# Patient Record
Sex: FEMALE | URBAN - METROPOLITAN AREA
[De-identification: names, ages, dates, MRNs, and addresses within clinical notes are randomized per-mention and may not be internally consistent; named-entity substitution may affect disease eponyms.]

---

## 2017-01-10 ENCOUNTER — APPOINTMENT (RX ONLY)
Dept: URBAN - METROPOLITAN AREA CLINIC 73 | Facility: CLINIC | Age: 47
Setting detail: DERMATOLOGY
End: 2017-01-10

## 2017-01-10 DIAGNOSIS — L71.8 OTHER ROSACEA: ICD-10-CM

## 2017-01-10 PROCEDURE — ? TREATMENT REGIMEN

## 2017-01-10 PROCEDURE — 99213 OFFICE O/P EST LOW 20 MIN: CPT

## 2017-01-10 PROCEDURE — ? COUNSELING

## 2017-01-10 PROCEDURE — ? PRESCRIPTION

## 2017-01-10 RX ORDER — DOXYCYCLINE 40 MG/1
CAPSULE ORAL
Qty: 30 | Refills: 1 | Status: ERX | COMMUNITY
Start: 2017-01-10

## 2017-01-10 RX ORDER — CLINDAMYCIN PHOSPHATE 10 MG/ML
LOTION TOPICAL
Qty: 1 | Refills: 1 | Status: ERX | COMMUNITY
Start: 2017-01-10

## 2017-01-10 RX ADMIN — CLINDAMYCIN PHOSPHATE: 10 LOTION TOPICAL at 15:15

## 2017-01-10 RX ADMIN — DOXYCYCLINE: 40 CAPSULE ORAL at 15:15

## 2017-01-10 ASSESSMENT — LOCATION DETAILED DESCRIPTION DERM: LOCATION DETAILED: NASAL SUPRATIP

## 2017-01-10 ASSESSMENT — LOCATION SIMPLE DESCRIPTION DERM: LOCATION SIMPLE: NOSE

## 2017-01-10 ASSESSMENT — LOCATION ZONE DERM: LOCATION ZONE: NOSE

## 2017-01-10 NOTE — PROCEDURE: TREATMENT REGIMEN
Detail Level: Zone
Initiate Treatment: Oracea 40mg qd and clindamycin lotion qd, add BP wash every couple of days

## 2017-01-10 NOTE — HPI: SKIN LESION
Is This A New Presentation, Or A Follow-Up?: Follow Up Skin Lesion
How Severe Is Your Skin Lesion?: moderate
Has Your Skin Lesion Been Treated?: been treated
When Was It Treated?: October 2016

## 2017-02-09 ENCOUNTER — RX ONLY (OUTPATIENT)
Age: 47
Setting detail: RX ONLY
End: 2017-02-09

## 2017-02-09 RX ORDER — DOXYCYCLINE HYCLATE 120 MG/1
TABLET, DELAYED RELEASE ORAL
Qty: 30 | Refills: 1 | Status: ERX | COMMUNITY
Start: 2017-02-09

## 2017-03-07 ENCOUNTER — APPOINTMENT (RX ONLY)
Dept: URBAN - METROPOLITAN AREA CLINIC 73 | Facility: CLINIC | Age: 47
Setting detail: DERMATOLOGY
End: 2017-03-07

## 2017-03-07 DIAGNOSIS — L71.8 OTHER ROSACEA: ICD-10-CM | Status: UNCHANGED

## 2017-03-07 PROCEDURE — ? TREATMENT REGIMEN

## 2017-03-07 PROCEDURE — ? PATIENT SPECIFIC COUNSELING

## 2017-03-07 PROCEDURE — ? RECOMMENDATIONS

## 2017-03-07 PROCEDURE — ? COUNSELING

## 2017-03-07 PROCEDURE — 99213 OFFICE O/P EST LOW 20 MIN: CPT

## 2017-03-07 PROCEDURE — ? PRESCRIPTION

## 2017-03-07 RX ORDER — AZELAIC ACID 0.15 G/G
AEROSOL, FOAM TOPICAL
Qty: 1 | Refills: 1 | Status: ERX | COMMUNITY
Start: 2017-03-07

## 2017-03-07 RX ORDER — DOXYCYCLINE HYCLATE 120 MG/1
TABLET, DELAYED RELEASE ORAL
Qty: 30 | Refills: 1 | Status: ERX

## 2017-03-07 RX ADMIN — AZELAIC ACID: 0.15 AEROSOL, FOAM TOPICAL at 16:22

## 2017-03-07 ASSESSMENT — LOCATION DETAILED DESCRIPTION DERM: LOCATION DETAILED: NASAL SUPRATIP

## 2017-03-07 ASSESSMENT — LOCATION SIMPLE DESCRIPTION DERM: LOCATION SIMPLE: NOSE

## 2017-03-07 ASSESSMENT — LOCATION ZONE DERM: LOCATION ZONE: NOSE

## 2017-03-07 NOTE — PROCEDURE: TREATMENT REGIMEN
Initiate Treatment: Mirvaso gel qam\\nFinacea foam qhs
Samples Given: Finacea foam
Continue Regimen: Doryx 120mg qd x 2 mos
Discontinue Regimen: Clindamycin/BPO
Detail Level: Zone

## 2017-03-07 NOTE — HPI: RASH (ROSACEA)
How Severe Is Your Rosacea?: moderate
Is This A New Presentation, Or A Follow-Up?: Follow Up Rosacea
Additional History: Patient stated that she is not able to wear makeup it just makes her more red. Meds are not working for her as she would like, she wants to discuss what other alternatives is there.  Pt reports redness on her supratip bothers her quite a bit - started s/p trauma (scratch 9/16 which got infected) and traditional medical management (oracea, mirvaso, etc) not helpful.  Doryx 120mg daily helping sl *past 4 weeks.  Wants more aggressive tx.  (pt sl emotional today - put dad in hospice yesterday).

## 2017-03-07 NOTE — PROCEDURE: PATIENT SPECIFIC COUNSELING
Detail Level: Zone
Discussed vbeam laser tx electively when best convenient\\n- gave her cost sheet for vbeam tx with dates of laser dates here at [facility]\\n-(briefly d/w pt possible other tx including off label isotret +/- second opinion derm md)

## 2017-03-09 ENCOUNTER — APPOINTMENT (RX ONLY)
Dept: URBAN - METROPOLITAN AREA CLINIC 73 | Facility: CLINIC | Age: 47
Setting detail: DERMATOLOGY
End: 2017-03-09

## 2017-03-09 DIAGNOSIS — L23.9 ALLERGIC CONTACT DERMATITIS, UNSPECIFIED CAUSE: ICD-10-CM

## 2017-03-09 PROCEDURE — 99213 OFFICE O/P EST LOW 20 MIN: CPT

## 2017-03-09 PROCEDURE — ? COUNSELING

## 2017-03-09 PROCEDURE — ? TREATMENT REGIMEN

## 2017-03-09 PROCEDURE — ? ORDER TESTS

## 2017-03-09 ASSESSMENT — LOCATION ZONE DERM: LOCATION ZONE: NOSE

## 2017-03-09 ASSESSMENT — LOCATION DETAILED DESCRIPTION DERM: LOCATION DETAILED: NASAL SUPRATIP

## 2017-03-09 ASSESSMENT — LOCATION SIMPLE DESCRIPTION DERM: LOCATION SIMPLE: NOSE

## 2017-03-09 NOTE — HPI: SKIN LESION
Is This A New Presentation, Or A Follow-Up?: Skin Lesion
How Severe Is Your Skin Lesion?: moderate
Has Your Skin Lesion Been Treated?: not been treated
Additional History: Pt was here 2 days ago when she was concerned about clinical pink fine papules on supranasal tip that were clinically most c/w rosacea (history also).  Pt reports she put finacea sample and restart mirvaso on it and it became inflamed w/ bumps and sl crusting.  We have not seen patient w. this flare and pt came in today for possible evaluation and culture.  in past clindamycin lotion and BP wash has helped.  Pt denies other topicals.

## 2017-03-09 NOTE — PROCEDURE: TREATMENT REGIMEN
Detail Level: Zone
Samples Given: Halog and cetaphil
Initiate Treatment: Mupriocin ointment mixed with halog ointment bid x 3-5 days \\nMupirocin ointment bid x 5 days \\nGentle cleanser
Discontinue Regimen: All other products
Plan: Patient has mupirocin ointment at home

## 2019-06-08 ENCOUNTER — APPOINTMENT (RX ONLY)
Dept: URBAN - METROPOLITAN AREA CLINIC 73 | Facility: CLINIC | Age: 49
Setting detail: DERMATOLOGY
End: 2019-06-08

## 2019-06-08 DIAGNOSIS — Z41.9 ENCOUNTER FOR PROCEDURE FOR PURPOSES OTHER THAN REMEDYING HEALTH STATE, UNSPECIFIED: ICD-10-CM

## 2019-06-08 PROCEDURE — ? BOTOX (U OR CC)

## 2019-06-08 NOTE — HPI: COSMETIC (BOTOX)
Have You Had Botox Before?: has had botox
Additional History: Pt here for Botox for babes
When Was Your Last Botox Treatment?: A few months ago

## 2019-06-08 NOTE — PROCEDURE: BOTOX (U OR CC)
Inferior Lateral Orbicularis Oculi Units: 0
Dilution (U/0.1 Cc): 4
Additional Area 3 Location: lower lids
Post-Care Instructions: Patient instructed to not lie down for 4 hours and limit physical activity for 24 hours. Patient instructed not to travel by airplane for 48 hours.
Glabellar Complex Units: 13
Detail Level: Detailed
Additional Area 1 Units: 6
Expiration Date (Month Year): 12/2020-4/2020
Document As Units Or Cc?: units
Additional Area 1 Location: crows feet
Additional Area 3 Units: 3
Including Pricing Information In The Note: No
Lot #: [id number]
Consent: Written consent obtained. Risks include but not limited to lid/brow ptosis, bruising, swelling, diplopia, temporary effect, incomplete chemical denervation.
Forehead Units/Cc: 7
Price (Use Numbers Only, No Special Characters Or $): 156
Additional Area 2 Location: lateral brows